# Patient Record
Sex: FEMALE | Race: BLACK OR AFRICAN AMERICAN | NOT HISPANIC OR LATINO | URBAN - METROPOLITAN AREA
[De-identification: names, ages, dates, MRNs, and addresses within clinical notes are randomized per-mention and may not be internally consistent; named-entity substitution may affect disease eponyms.]

---

## 2022-03-01 ENCOUNTER — GYNECOLOGY VISIT (OUTPATIENT)
Dept: OBGYN | Facility: CLINIC | Age: 27
End: 2022-03-01

## 2022-03-01 VITALS
DIASTOLIC BLOOD PRESSURE: 80 MMHG | HEIGHT: 61 IN | WEIGHT: 215.7 LBS | BODY MASS INDEX: 40.72 KG/M2 | SYSTOLIC BLOOD PRESSURE: 118 MMHG

## 2022-03-01 DIAGNOSIS — Z32.01 PREGNANCY TEST POSITIVE: ICD-10-CM

## 2022-03-01 DIAGNOSIS — N91.2 AMENORRHEA: Primary | ICD-10-CM

## 2022-03-01 PROCEDURE — 99202 OFFICE O/P NEW SF 15 MIN: CPT | Mod: 25 | Performed by: OBSTETRICS & GYNECOLOGY

## 2022-03-01 PROCEDURE — 76830 TRANSVAGINAL US NON-OB: CPT | Mod: TC | Performed by: OBSTETRICS & GYNECOLOGY

## 2022-03-01 NOTE — NON-PROVIDER
Patient here for GYN/DUB.  UPT= Positive   LMP= 12/24/2021  CORIN= 9/30/2022  GA= 9W4D  Last pap N/a  Phone number: [phone]  Pharmacy verified  C/o   Nausea

## 2022-03-01 NOTE — PROGRESS NOTES
"CC: Missed menses    Greer Roca is a 26 y.o.  who presents presents due to missed menses. Patient's last menstrual period was 2021 (exact date)..  She is sure of her LMP.  Based on LMP, she is 9w4d today. She was not using anything for birthcontrol.  This is was a planned pregnancy.  She reports nausea, reports vomiting when her stomach is empty or she eats something that \"doesn't agree with her,\" denies vaginal bleeding/spotting, and denies cramping.        OB History:    OB History    Para Term  AB Living   0 0 0 0 0 0   SAB IAB Ectopic Molar Multiple Live Births   0 0 0 0 0 0           Objective:   Vitals:  /80 (BP Location: Right arm, Patient Position: Sitting, BP Cuff Size: Adult)   Ht 1.549 m (5' 1\")   Wt 97.8 kg (215 lb 11.2 oz)   Body mass index is 40.76 kg/m². (Goal BM I>18 <25)  Exam:  General: is in no apparent distress  Psychiatric: appropriate affect, alert and oriented x3, intact judgment and insight.  Respiratory: normal effort  Female GYN: normal female external genitalia without lesions      Procedure:  Transvaginal US performed by me and per my read:    Indication: Amenorrhea, +UPT.     Findings:   Alarcon intrauterine pregnancy @ 10w3d by CRL.   Positive yolk sac.   Positive fetal cardiac activity    Right ovary not visualized.   Left Ovary not visualized.   No free fluid in the cul-de-sac.    Impression:   Viable IUP @ 10w3d.  CORIN by US of 22.  CORIN by LMP: 22  Final CORIN: 22      A/P:   26 y.o.  here for missed menses.    1. Amenorrhea due to pregnancy.     - US today is NOT c/w LMP. Final CORIN of 22.     - Normal pregnancy s/sx discussed   - Advised prenatal vitamins, healthy well rounded diet, adequate hydration, and continued exercise.     - Labs:     - PNL not ordered.  Pt is self pay and needs financial visit however reports she has to return to [facility] 2 days from now to update her immigration paperwork.  She will get care in " [facility] while there and then will return here.  Advised she will need financial visit and establishment of pregnancy package.  Instructed to bring results of any tests/PNL she gets while in [facility] back with her.      Total Time: 20 minutes spent in chart review, exam, counseling and documention      Negar Olivas D.O.